# Patient Record
Sex: FEMALE | Race: OTHER | NOT HISPANIC OR LATINO
[De-identification: names, ages, dates, MRNs, and addresses within clinical notes are randomized per-mention and may not be internally consistent; named-entity substitution may affect disease eponyms.]

---

## 2023-08-11 ENCOUNTER — NON-APPOINTMENT (OUTPATIENT)
Age: 63
End: 2023-08-11

## 2023-08-11 DIAGNOSIS — Z78.9 OTHER SPECIFIED HEALTH STATUS: ICD-10-CM

## 2023-08-11 DIAGNOSIS — Z82.49 FAMILY HISTORY OF ISCHEMIC HEART DISEASE AND OTHER DISEASES OF THE CIRCULATORY SYSTEM: ICD-10-CM

## 2023-08-11 DIAGNOSIS — Z92.89 PERSONAL HISTORY OF OTHER MEDICAL TREATMENT: ICD-10-CM

## 2023-08-11 DIAGNOSIS — K21.9 GASTRO-ESOPHAGEAL REFLUX DISEASE W/OUT ESOPHAGITIS: ICD-10-CM

## 2023-08-11 DIAGNOSIS — R07.89 OTHER CHEST PAIN: ICD-10-CM

## 2023-08-11 DIAGNOSIS — R60.9 EDEMA, UNSPECIFIED: ICD-10-CM

## 2023-08-11 PROBLEM — Z00.00 ENCOUNTER FOR PREVENTIVE HEALTH EXAMINATION: Status: ACTIVE | Noted: 2023-08-11

## 2023-08-11 RX ORDER — FAMOTIDINE 20 MG/1
20 TABLET, FILM COATED ORAL
Refills: 0 | Status: ACTIVE | COMMUNITY

## 2023-08-30 ENCOUNTER — APPOINTMENT (OUTPATIENT)
Dept: CARDIOLOGY | Facility: CLINIC | Age: 63
End: 2023-08-30
Payer: COMMERCIAL

## 2023-08-30 VITALS
BODY MASS INDEX: 27.05 KG/M2 | HEIGHT: 62 IN | WEIGHT: 147 LBS | HEART RATE: 87 BPM | SYSTOLIC BLOOD PRESSURE: 128 MMHG | OXYGEN SATURATION: 97 % | DIASTOLIC BLOOD PRESSURE: 68 MMHG

## 2023-08-30 VITALS — DIASTOLIC BLOOD PRESSURE: 68 MMHG | SYSTOLIC BLOOD PRESSURE: 118 MMHG

## 2023-08-30 DIAGNOSIS — R07.9 CHEST PAIN, UNSPECIFIED: ICD-10-CM

## 2023-08-30 DIAGNOSIS — M79.10 MYALGIA, UNSPECIFIED SITE: ICD-10-CM

## 2023-08-30 PROCEDURE — 99214 OFFICE O/P EST MOD 30 MIN: CPT | Mod: 25

## 2023-08-30 PROCEDURE — 93000 ELECTROCARDIOGRAM COMPLETE: CPT

## 2023-08-30 RX ORDER — IBUPROFEN 200 MG/1
CAPSULE, LIQUID FILLED ORAL
Refills: 0 | Status: ACTIVE | COMMUNITY

## 2023-08-30 NOTE — CARDIOLOGY SUMMARY
[de-identified] : Normal sinus rhythm, RSR prime, poor R wave progression [de-identified] : Active Problems - GERD - Abnormal ECG - EXERCISE STRESS TEST: 7/18/2023, normal, below average exercise tolerance, [facility] (11/8/2022, submaximal, dyspnea and fatigue at 76% of the patient's maximal predicted heart rate) - ECHOCARDIOGRAM: 11/17/2022, EF 55-60%, no stenotic or regurgitant lesions - CAROTID ULTRASOUND: 6/26/2023, nonobstructive disease - hypercholesterolemia: Acceptable 10 year cardiac risk 06/27/2022

## 2023-08-30 NOTE — HISTORY OF PRESENT ILLNESS
[FreeTextEntry1] : The patient is a 63-year-old female with a past medical history remarkable for hypercholesterolemia, carotid artery disease and an abnormal ECG who presented to the office for evaluation of chest pain. An echocardiogram from November demonstrated a normal ejection fraction and the absence of valve disease.  An exercise stress test was performed on July 18.  It was normal and demonstrated a below average exercise tolerance.  Her carotid ultrasound demonstrated nonobstructive disease. The patient has been walking approximately 20 minutes/day without chest pain or dyspnea.  She reports lower extremity cramps.

## 2023-08-30 NOTE — DISCUSSION/SUMMARY
[FreeTextEntry1] : 1 chest pain Resolved.  Status post normal exercise stress test. 2 carotid artery disease: Nonobstructive. After review of the patient's laboratories aspirin and statin therapy will be added to her medical regimen 3 lower extremity cramps  Normal peripheral pulses.  Probably musculoskeletal in etiology. Ms. NATI REYES was instructed to continue her their exercise program.  The patient was instructed to return to this office in 1 year for reevaluation.  She requested a 6-month follow-up [EKG obtained to assist in diagnosis and management of assessed problem(s)] : EKG obtained to assist in diagnosis and management of assessed problem(s)

## 2023-08-30 NOTE — PHYSICAL EXAM
[Well Developed] : well developed [Well Nourished] : well nourished [No Acute Distress] : no acute distress [Normal Conjunctiva] : normal conjunctiva [Normal Venous Pressure] : normal venous pressure [No Carotid Bruit] : no carotid bruit [Normal S1, S2] : normal S1, S2 [No Murmur] : no murmur [No Rub] : no rub [No Gallop] : no gallop [Clear Lung Fields] : clear lung fields [Good Air Entry] : good air entry [No Respiratory Distress] : no respiratory distress  [Soft] : abdomen soft [Non Tender] : non-tender [No Masses/organomegaly] : no masses/organomegaly [Normal Bowel Sounds] : normal bowel sounds 36.3 [Normal Gait] : normal gait [No Edema] : no edema [No Cyanosis] : no cyanosis [No Clubbing] : no clubbing [No Varicosities] : no varicosities [Normal Radial B/L] : normal radial B/L [Normal PT B/L] : normal PT B/L [No Rash] : no rash [No Skin Lesions] : no skin lesions [Moves all extremities] : moves all extremities [No Focal Deficits] : no focal deficits [Normal Speech] : normal speech [Alert and Oriented] : alert and oriented [Normal memory] : normal memory

## 2024-05-06 ENCOUNTER — NON-APPOINTMENT (OUTPATIENT)
Age: 64
End: 2024-05-06

## 2024-06-20 ENCOUNTER — APPOINTMENT (OUTPATIENT)
Dept: CARDIOLOGY | Facility: CLINIC | Age: 64
End: 2024-06-20
Payer: COMMERCIAL

## 2024-06-20 VITALS
HEART RATE: 69 BPM | HEIGHT: 61.5 IN | SYSTOLIC BLOOD PRESSURE: 120 MMHG | WEIGHT: 146 LBS | DIASTOLIC BLOOD PRESSURE: 60 MMHG | BODY MASS INDEX: 27.21 KG/M2 | OXYGEN SATURATION: 97 %

## 2024-06-20 DIAGNOSIS — M79.601 PAIN IN RIGHT ARM: ICD-10-CM

## 2024-06-20 DIAGNOSIS — E78.5 HYPERLIPIDEMIA, UNSPECIFIED: ICD-10-CM

## 2024-06-20 DIAGNOSIS — I77.9 DISORDER OF ARTERIES AND ARTERIOLES, UNSPECIFIED: ICD-10-CM

## 2024-06-20 DIAGNOSIS — M79.602 PAIN IN RIGHT ARM: ICD-10-CM

## 2024-06-20 DIAGNOSIS — R94.31 ABNORMAL ELECTROCARDIOGRAM [ECG] [EKG]: ICD-10-CM

## 2024-06-20 PROCEDURE — 93000 ELECTROCARDIOGRAM COMPLETE: CPT

## 2024-06-20 PROCEDURE — 99214 OFFICE O/P EST MOD 30 MIN: CPT | Mod: 25

## 2024-06-20 RX ORDER — SUCRALFATE 1 G/1
TABLET ORAL
Refills: 0 | Status: DISCONTINUED | COMMUNITY
End: 2024-06-20

## 2024-06-28 ENCOUNTER — APPOINTMENT (OUTPATIENT)
Dept: CARDIOLOGY | Facility: CLINIC | Age: 64
End: 2024-06-28
Payer: COMMERCIAL

## 2024-06-28 PROCEDURE — 93306 TTE W/DOPPLER COMPLETE: CPT

## 2024-06-29 NOTE — DISCUSSION/SUMMARY
[EKG obtained to assist in diagnosis and management of assessed problem(s)] : EKG obtained to assist in diagnosis and management of assessed problem(s) [FreeTextEntry1] : Upper extremity discomfort  The patient was instructed to follow-up with her neurologist to rule out cervical spine disease   Abnormal ECG  An echocardiogram was ordered to rule out a cardiomyopathy or valvular abnormality as the etiology of her abnormal ECG and upper extremity discomfort  Ms. NATI REYES  was instructed to undergo treadmill nuclear stress testing to rule out myocardial ischemia as the etiology of her abnormal ECG and upper extremity discomfort  carotid artery disease nonobstructive. Laboratories permitting, please add aspirin and statin therapy  RTO after testing

## 2024-06-29 NOTE — CARDIOLOGY SUMMARY
[de-identified] : Normal sinus rhythm, RSR prime, poor R wave progression [de-identified] : - GERD - Abnormal ECG - EXERCISE STRESS TEST: 7/18/2023, normal, below average exercise tolerance, [facility] (11/8/2022, submaximal, dyspnea and fatigue at 76% of the patient's maximal predicted heart rate) - ECHOCARDIOGRAM: 6/28/2024, EF 55-60%, no valve disease (11/17/2022, EF 55-60%, no stenotic or regurgitant lesions) - CAROTID ULTRASOUND: 6/26/2023, nonobstructive disease - hypercholesterolemia: Acceptable 10 year cardiac risk 06/27/2022

## 2024-06-29 NOTE — HISTORY OF PRESENT ILLNESS
[FreeTextEntry1] : The patient is a 63-year-old female with a past medical history remarkable for hypercholesterolemia, carotid artery disease and an abnormal ECG who presented to the office for evaluation of upper extremity discomfort. The discomfort occurs at rest.  It is not ameliorated or exacerbated by any intervention.  The patient reports that she was evaluated by her primary care physician and the emergency room both recommended cardiology evaluation  The patient has been walking approximately 20 minutes/day without chest pain or dyspnea.

## 2024-07-02 ENCOUNTER — APPOINTMENT (OUTPATIENT)
Dept: CARDIOLOGY | Facility: CLINIC | Age: 64
End: 2024-07-02

## 2024-07-05 ENCOUNTER — NON-APPOINTMENT (OUTPATIENT)
Age: 64
End: 2024-07-05

## 2024-07-05 ENCOUNTER — TRANSCRIPTION ENCOUNTER (OUTPATIENT)
Age: 64
End: 2024-07-05

## 2024-07-19 ENCOUNTER — APPOINTMENT (OUTPATIENT)
Dept: CARDIOLOGY | Facility: CLINIC | Age: 64
End: 2024-07-19

## 2024-07-24 ENCOUNTER — APPOINTMENT (OUTPATIENT)
Dept: CARDIOLOGY | Facility: CLINIC | Age: 64
End: 2024-07-24
Payer: COMMERCIAL

## 2024-07-24 VITALS
SYSTOLIC BLOOD PRESSURE: 140 MMHG | HEIGHT: 61.5 IN | DIASTOLIC BLOOD PRESSURE: 76 MMHG | HEART RATE: 75 BPM | WEIGHT: 135 LBS | OXYGEN SATURATION: 99 % | BODY MASS INDEX: 25.16 KG/M2

## 2024-07-24 DIAGNOSIS — M79.601 PAIN IN RIGHT ARM: ICD-10-CM

## 2024-07-24 DIAGNOSIS — E78.5 HYPERLIPIDEMIA, UNSPECIFIED: ICD-10-CM

## 2024-07-24 DIAGNOSIS — I77.9 DISORDER OF ARTERIES AND ARTERIOLES, UNSPECIFIED: ICD-10-CM

## 2024-07-24 DIAGNOSIS — M79.602 PAIN IN RIGHT ARM: ICD-10-CM

## 2024-07-24 DIAGNOSIS — R94.31 ABNORMAL ELECTROCARDIOGRAM [ECG] [EKG]: ICD-10-CM

## 2024-07-24 PROCEDURE — 99214 OFFICE O/P EST MOD 30 MIN: CPT

## 2024-07-24 RX ORDER — PANTOPRAZOLE SODIUM 40 MG/1
40 TABLET, DELAYED RELEASE ORAL TWICE DAILY
Refills: 0 | Status: ACTIVE | COMMUNITY

## 2024-07-24 NOTE — DISCUSSION/SUMMARY
[FreeTextEntry1] : Upper extremity discomfort  The patient was instructed to follow-up with her neurologist to rule out cervical spine disease   Abnormal ECG  Echocardiography demonstrated a normal ejection fraction and the absence of valve disease. Nuclear stress testing was canceled by the patient   carotid artery disease nonobstructive. Laboratory results remain unavailable.   Laboratories permitting, please add aspirin and statin therapy  RTO 1 year

## 2024-07-24 NOTE — HISTORY OF PRESENT ILLNESS
[FreeTextEntry1] : The patient is a 63-year-old female with a past medical history remarkable for hypercholesterolemia, carotid artery disease and an abnormal ECG who presented to the office for evaluation of upper extremity discomfort. The patient reported that she was evaluated by her primary care physician and the emergency room. Both recommended cardiology evaluation  Echocardiography demonstrated a normal ejection fraction and the absence of valve disease.  Nuclear stress testing was canceled by the patient.  The patient reports that her arm discomfort has resolved. The patient has been walking approximately 20 minutes/day without chest pain or dyspnea.

## 2024-07-24 NOTE — CARDIOLOGY SUMMARY
[de-identified] : - GERD - Abnormal ECG -NUCLEAR STRESS TEST:  Cancelled by pt 6/2024,  EXERCISE STRESS TEST: 7/18/2023, normal, below average exercise tolerance, [facility]  - ECHOCARDIOGRAM: 6/28/2024, EF 55-60%, no valve disease  - CAROTID ULTRASOUND: 6/26/2023, nonobstructive disease - hypercholesterolemia: Acceptable 10 year cardiac risk 06/27/2022